# Patient Record
Sex: MALE | Race: WHITE | Employment: FULL TIME | URBAN - METROPOLITAN AREA
[De-identification: names, ages, dates, MRNs, and addresses within clinical notes are randomized per-mention and may not be internally consistent; named-entity substitution may affect disease eponyms.]

---

## 2024-01-02 ENCOUNTER — HOSPITAL ENCOUNTER (OUTPATIENT)
Dept: PHYSICAL THERAPY | Age: 34
Setting detail: THERAPIES SERIES
Discharge: HOME | End: 2024-01-02
Attending: INTERNAL MEDICINE
Payer: COMMERCIAL

## 2024-01-02 DIAGNOSIS — S33.9XXA SPRAIN OF LIGAMENT OF LUMBOSACRAL JOINT, INITIAL ENCOUNTER: Primary | ICD-10-CM

## 2024-01-02 DIAGNOSIS — S30.0XXA LUMBAR CONTUSION, INITIAL ENCOUNTER: ICD-10-CM

## 2024-01-02 PROCEDURE — 97161 PT EVAL LOW COMPLEX 20 MIN: CPT

## 2024-01-02 PROCEDURE — 97110 THERAPEUTIC EXERCISES: CPT

## 2024-01-02 NOTE — PLAN OF CARE
posture and demonstrate good body mechanics with sitting, bending, and lifting  Reduced ability to perform lifting, reaching, carrying tasks    Participation Restrictions:   Reduced participation in home management   Reduced participation in work activities  Reduced participation in social activities    Classification :   Signs/symptoms consistent with Lumbar instability//motor control subgroup.       Barriers to/and or personal factors that will affect rehab potential:   None noted    Prognosis/Rehab Potential:  [] Excellent     [x] Good     [] Fair     [] Poor         Tolerance of evaluation/treatment:   [] Excellent     [x] Good     [] Fair     [] Poor      Physical Therapy Evaluation Complexity Justification  [x] A history of present problem and no personal factors and/or co-morbidities that impact the plan of care  [x] A total of 1-2 elements  found upon examination of body systems using standardized tests and measures addressing any of the following: body structures, functions (impairments), activity limitations, and/or participation restrictions  [x] A clinical presentation with stable and/or uncomplicated characteristics   [x] Clinical decision making of LOW (23687 - Typically 20 minutes face-to-face) complexity using standardized patient assessment instrument and/or measurable assessment of functional outcome.    GOALS     Patient stated goal: See progress; Get back to full duty at work  Status: [] Progressing: [] Met: [] Not Met: [] Adjusted    Therapist goals for Patient:   Short Term Goals: To be achieved in: 2 weeks  Independent in HEP and progression per patient tolerance, in order to progress toward full function and prevent re-injury.    Status: [] Progressing: [] Met: [] Not Met: [] Adjusted  Patient will have a decrease in pain to 0/10 to help  facilitate improvement in movement, function, and ADLs as indicated by functional deficits.   Status: [] Progressing: [] Met: [] Not Met: []

## 2024-01-02 NOTE — FLOWSHEET NOTE
(63571)     Gait (88380)      Dry Needle 1-2 muscle (37375)     Aquatic Therex (21256)      Dry Needle 3+ muscle (72169)     Iontophoresis (20004)      VASO (64443)     Ultrasound (13999)      Group Therapy (38046)     Estim Attended (60690)      Canalith Repositioning (84543)     Other:      Other:    Totals   1 9:10 9:25 15  1     Total Treatment Minutes 15 + EVAL       Charge Justification:  (08258) THERAPEUTIC EXERCISE - Provided verbal/tactile cueing for activities related to strengthening, flexibility, endurance, ROM performed to prevent loss of range of motion, maintain or improve muscular strength or increase flexibility, following either an injury or surgery.   (21907) HOME EXERCISE PROGRAM - Reviewed/Progressed HEP activities related to strengthening, flexibility, endurance, ROM performed to prevent loss of range of motion, maintain or improve muscular strength or increase flexibility, following either an injury or surgery.    TREATMENT PLAN   Plan: POC Initiated today- see eval for details    Electronically Signed by Paris Chaudhry PT , DPT             Date: 01/02/2024     Note: If patient does not return for scheduled/recommended follow up visits, this note will serve as a discharge from care along with the most recent update on progress.

## 2024-01-10 ENCOUNTER — OFFICE VISIT (OUTPATIENT)
Dept: ORTHOPEDIC SURGERY | Age: 34
End: 2024-01-10

## 2024-01-10 VITALS — WEIGHT: 150 LBS | BODY MASS INDEX: 23.49 KG/M2

## 2024-01-10 DIAGNOSIS — S30.0XXD LUMBAR CONTUSION, SUBSEQUENT ENCOUNTER: Primary | ICD-10-CM

## 2024-01-10 DIAGNOSIS — S33.9XXD SPRAIN AND STRAIN OF LUMBOSACRAL JOINT/LIGAMENT, SUBSEQUENT ENCOUNTER: ICD-10-CM
